# Patient Record
Sex: FEMALE | Race: ASIAN | NOT HISPANIC OR LATINO | URBAN - METROPOLITAN AREA
[De-identification: names, ages, dates, MRNs, and addresses within clinical notes are randomized per-mention and may not be internally consistent; named-entity substitution may affect disease eponyms.]

---

## 2021-02-16 ENCOUNTER — EMERGENCY (EMERGENCY)
Facility: HOSPITAL | Age: 21
LOS: 1 days | Discharge: HOME | End: 2021-02-16
Attending: EMERGENCY MEDICINE | Admitting: EMERGENCY MEDICINE
Payer: COMMERCIAL

## 2021-02-16 VITALS
RESPIRATION RATE: 16 BRPM | HEART RATE: 84 BPM | DIASTOLIC BLOOD PRESSURE: 98 MMHG | OXYGEN SATURATION: 98 % | TEMPERATURE: 99 F | SYSTOLIC BLOOD PRESSURE: 151 MMHG | WEIGHT: 164.02 LBS | HEIGHT: 67 IN

## 2021-02-16 DIAGNOSIS — R51.9 HEADACHE, UNSPECIFIED: ICD-10-CM

## 2021-02-16 LAB
BASE EXCESS BLDMV CALC-SCNC: 0.7 MMOL/L — SIGNIFICANT CHANGE UP
COHGB MFR BLDMV: 0.5 % — SIGNIFICANT CHANGE UP
HCO3 BLDMV-SCNC: 26 MMOL/L — SIGNIFICANT CHANGE UP
HGB FLD-MCNC: 13.9 G/DL — SIGNIFICANT CHANGE UP (ref 11.5–15.5)
METHGB MFR BLDMV: 0.3 % — SIGNIFICANT CHANGE UP
O2 CT VFR BLD CALC: 27 MMHG — LOW (ref 30–65)
O2 CT VFR BLDMV CALC: 9 ML/DL — SIGNIFICANT CHANGE UP
OXYHGB MFR BLDMV: 47 % — SIGNIFICANT CHANGE UP
PCO2 BLDMV: 43 MMHG — SIGNIFICANT CHANGE UP (ref 30–65)
PH BLDMV: 7.4 — SIGNIFICANT CHANGE UP (ref 7.2–7.45)
SAO2 % BLDMV: 47 % — SIGNIFICANT CHANGE UP

## 2021-02-16 PROCEDURE — 99284 EMERGENCY DEPT VISIT MOD MDM: CPT

## 2021-02-16 PROCEDURE — 36415 COLL VENOUS BLD VENIPUNCTURE: CPT

## 2021-02-16 PROCEDURE — 85018 HEMOGLOBIN: CPT

## 2021-02-16 PROCEDURE — 99283 EMERGENCY DEPT VISIT LOW MDM: CPT
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
## 2021-02-16 NOTE — ED PROVIDER NOTE - CONSTITUTIONAL, MLM
very anxious appearing, awake, alert, oriented to person, place, time/situation, standing next to bed, tearful, poor eye contact normal...
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
## 2021-02-16 NOTE — ED ADULT NURSE NOTE - NSIMPLEMENTINTERV_GEN_ALL_ED
Implemented All Universal Safety Interventions:  [place] to call system. Call bell, personal items and telephone within reach. Instruct patient to call for assistance. Room bathroom lighting operational. Non-slip footwear when patient is off stretcher. Physically safe environment: no spills, clutter or unnecessary equipment. Stretcher in lowest position, wheels locked, appropriate side rails in place.
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
## 2021-02-16 NOTE — ED PROVIDER NOTE - PATIENT PORTAL LINK FT
You can access the FollowMyHealth Patient Portal offered by [facility] by registering at the following website: [email or web address]. By joining [facility]’s FollowMyHealth portal, you will also be able to view your health information using other applications (apps) compatible with our system.
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
## 2021-02-16 NOTE — ED PROVIDER NOTE - NSFOLLOWUPINSTRUCTIONS_ED_ALL_ED_FT
Please see your primary care provider in 2-3 days.  Call for appointment.  If you have any problems with followup, please call the ED Referral Coordinator at [phone].  Return to the ER if symptoms worsen or other concerns.      Carbon Monoxide Poisoning    WHAT YOU NEED TO KNOW:    Carbon monoxide (CO) poisoning is a life-threatening condition caused by exposure to high levels of CO. Your brain, organs, and tissues can be damaged from a lack of oxygen. You will need to watch for new signs and symptoms for several weeks or months after treatment.    DISCHARGE INSTRUCTIONS:    Call your local emergency number (911 in the ) if:   •You have chest pain or an irregular or fast heartbeat.      •You or someone close to you has a seizure or is unconscious.      •You have trouble breathing or are breathing faster than usual.      •You feel like you are going to faint.      •You feel weak, have trouble moving, or have severe muscle pain.      •Your urine becomes dark or red.      Call your doctor if:   •You feel dizzy.      •You have a headache or start to vomit.      •Your eyesight becomes blurred.      •You have questions or concerns about your condition or care.      What to do if you think you or someone else was exposed to CO: CO poisoning can seem like the flu. Anyone who may have been exposed to CO needs to be checked by a healthcare provider. The following are steps to take if you believe you or someone else is near a source of CO:   •Move into fresh air. If safely possible, shut off the source of the CO. Wait for a professional to help you if you cannot do this safely.      •Call 911. Explain when the exposure happened and how long you think it lasted.       •Start CPR if needed and you are trained on how to do this. CPR may be needed if the person is not breathing.      Prevent CO poisoning:   •Install a CO detector in every sleeping area in your home. Place it 5 feet above the floor and away from fireplaces or gas-burning equipment. Change the batteries twice each year.      •Check your chimney, furnace, or wood stoves. Check for problems every year before you use them. Have your fireplace flue cleaned on a regular basis.       •Be careful with gas appliances. Do not use barbecues or heaters that burn fuel inside your home or other closed spaces. Do not use your gas kitchen oven to heat your home. Make sure appliances are properly hooded or vented.      •Do not let motor vehicles run in closed areas. This includes letting your car run in a garage. If the car is outside, check that the exhaust pipe is not blocked.      •Do not smoke. Cigarette smoke contains small amounts of CO. This increases your risk of CO poisoning if you are exposed to a source of CO. Ask your healthcare provider for information if you need help quitting.      Follow up with your healthcare provider as directed: You may need to return to have more tests. Write down your questions so you remember to ask them during your visits.        Anxiety    WHAT YOU NEED TO KNOW:    Anxiety is a condition that causes you to feel extremely worried or nervous. The feelings are so strong that they can cause problems with your daily activities or sleep. Anxiety may be triggered by something you fear, or it may happen without a cause. Family or work stress, smoking, caffeine, and alcohol can increase your risk for anxiety. Certain medicines or health conditions can also increase your risk. Anxiety can become a long-term condition if it is not managed or treated.     DISCHARGE INSTRUCTIONS:    Call 911 if:     You have chest pain, tightness, or heaviness that may spread to your shoulders, arms, jaw, neck, or back.      You feel like hurting yourself or someone else.     Contact your healthcare provider if:     Your symptoms get worse or do not get better with treatment.      Your anxiety keeps you from doing your regular daily activities.      You have new symptoms since your last visit.      You have questions or concerns about your condition or care.    Medicines:     Medicines may be given to help you feel more calm and relaxed, and decrease your symptoms.      Take your medicine as directed. Contact your healthcare provider if you think your medicine is not helping or if you have side effects. Tell him of her if you are allergic to any medicine. Keep a list of the medicines, vitamins, and herbs you take. Include the amounts, and when and why you take them. Bring the list or the pill bottles to follow-up visits. Carry your medicine list with you in case of an emergency.    Follow up with your healthcare provider within 2 weeks or as directed: Write down your questions so you remember to ask them during your visits.    Manage anxiety:     Talk to someone about your anxiety. Your healthcare provider may suggest counseling. Cognitive behavioral therapy can help you understand and change how you react to events that trigger your symptoms. You might feel more comfortable talking with a friend or family member about your anxiety. Choose someone you know will be supportive and encouraging.      Find ways to relax. Activities such as exercise, meditation, or listening to music can help you relax. Spend time with friends, or do things you enjoy.      Practice deep breathing. Deep breathing can help you relax when you feel anxious. Focus on taking slow, deep breaths several times a day, or during an anxiety attack. Breathe in through your nose and out through your mouth.       Create a regular sleep routine. Regular sleep can help you feel calmer during the day. Go to sleep and wake up at the same times every day. Do not watch television or use the computer right before bed. Your room should be comfortable, dark, and quiet.       Eat a variety of healthy foods. Healthy foods include fruits, vegetables, low-fat dairy products, lean meats, fish, whole-grain breads, and cooked beans. Healthy foods can help you feel less anxious and have more energy.      Exercise regularly. Exercise can increase your energy level. Exercise may also lift your mood and help you sleep better. Your healthcare provider can help you create an exercise plan.      Do not smoke. Nicotine and other chemicals in cigarettes and cigars can increase anxiety. Ask your healthcare provider for information if you currently smoke and need help to quit. E-cigarettes or smokeless tobacco still contain nicotine. Talk to your healthcare provider before you use these products.       Do not have caffeine. Caffeine can make your symptoms worse. Do not have foods or drinks that are meant to increase your energy level.      Limit or do not drink alcohol. Ask your healthcare provider if alcohol is safe for you. You may not be able to drink alcohol if you take certain anxiety or depression medicines. Limit alcohol to 1 drink per day if you are a woman. Limit alcohol to 2 drinks per day if you are a man. A drink of alcohol is 12 ounces of beer, 5 ounces of wine, or 1½ ounces of liquor.       Do not use drugs. Drugs can make your anxiety worse. It can also make anxiety hard to manage. Talk to your healthcare provider if you use drugs and want help to quit.

## 2023-04-07 PROBLEM — E03.9 HYPOTHYROIDISM, UNSPECIFIED: Chronic | Status: ACTIVE | Noted: 2021-02-16

## 2023-04-11 ENCOUNTER — APPOINTMENT (OUTPATIENT)
Dept: PULMONOLOGY | Facility: CLINIC | Age: 23
End: 2023-04-11
Payer: MEDICAID

## 2023-04-11 VITALS
WEIGHT: 165 LBS | HEIGHT: 67 IN | HEART RATE: 76 BPM | DIASTOLIC BLOOD PRESSURE: 68 MMHG | BODY MASS INDEX: 25.9 KG/M2 | SYSTOLIC BLOOD PRESSURE: 103 MMHG | OXYGEN SATURATION: 100 % | TEMPERATURE: 96.6 F

## 2023-04-11 DIAGNOSIS — Z83.49 FAMILY HISTORY OF OTHER ENDOCRINE, NUTRITIONAL AND METABOLIC DISEASES: ICD-10-CM

## 2023-04-11 DIAGNOSIS — F17.200 NICOTINE DEPENDENCE, UNSPECIFIED, UNCOMPLICATED: ICD-10-CM

## 2023-04-11 DIAGNOSIS — Z78.9 OTHER SPECIFIED HEALTH STATUS: ICD-10-CM

## 2023-04-11 DIAGNOSIS — J30.9 ALLERGIC RHINITIS, UNSPECIFIED: ICD-10-CM

## 2023-04-11 PROBLEM — Z00.00 ENCOUNTER FOR PREVENTIVE HEALTH EXAMINATION: Status: ACTIVE | Noted: 2023-04-11

## 2023-04-11 PROCEDURE — 94618 PULMONARY STRESS TESTING: CPT

## 2023-04-11 PROCEDURE — 99204 OFFICE O/P NEW MOD 45 MIN: CPT | Mod: 25

## 2023-04-11 PROCEDURE — 94060 EVALUATION OF WHEEZING: CPT

## 2023-04-11 PROCEDURE — ZZZZZ: CPT

## 2023-04-11 PROCEDURE — 94726 PLETHYSMOGRAPHY LUNG VOLUMES: CPT

## 2023-04-11 PROCEDURE — 94729 DIFFUSING CAPACITY: CPT

## 2023-04-11 PROCEDURE — 99407 BEHAV CHNG SMOKING > 10 MIN: CPT

## 2023-04-11 RX ORDER — NORGESTIMATE AND ETHINYL ESTRADIOL 0.25-0.035
0.25-35 KIT ORAL
Refills: 0 | Status: ACTIVE | COMMUNITY

## 2023-04-11 RX ORDER — NICOTINE 21 MG/24HR
14 PATCH, TRANSDERMAL 24 HOURS TRANSDERMAL DAILY
Qty: 1 | Refills: 1 | Status: ACTIVE | COMMUNITY
Start: 2023-04-11 | End: 1900-01-01

## 2023-04-11 RX ORDER — FLUTICASONE PROPIONATE 50 UG/1
50 SPRAY, METERED NASAL TWICE DAILY
Qty: 1 | Refills: 2 | Status: ACTIVE | COMMUNITY
Start: 2023-04-11 | End: 1900-01-01

## 2023-04-11 RX ORDER — LEVOTHYROXINE SODIUM 137 UG/1
TABLET ORAL
Refills: 0 | Status: ACTIVE | COMMUNITY

## 2023-04-11 NOTE — REASON FOR VISIT
[Consultation] : a consultation [Abnormal CXR/ Chest CT] : an abnormal CXR/ chest CT [Cough] : cough [Nicotine Dependence] : nicotine dependence [TextBox_13] : Nikki Chin MD (phone [phone])

## 2023-04-11 NOTE — HISTORY OF PRESENT ILLNESS
[Current] : current [TextBox_4] : 23 year old female with h/o eating disorder, current smoker (tobacco and vaping) came to clinic for diagnosis of Emphysema on CT chest. Patient is vaping and smoking for last 5 years. She was enrolled in study at [facility] where CT chest was done for study for patients doing vaping. She denies SOB with exertion or at rest. She is c/o cough, nasal congestion and throat clearing. Denies fever, sore throat or pedal edema.

## 2023-04-11 NOTE — PHYSICAL EXAM
[No Acute Distress] : no acute distress [Well Nourished] : well nourished [Normal Oropharynx] : normal oropharynx [II] : Mallampati Class: II [Normal Appearance] : normal appearance [No JVD] : no jvd [Normal Rate/Rhythm] : normal rate/rhythm [Normal S1, S2] : normal s1, s2 [No Resp Distress] : no resp distress [Clear to Auscultation Bilaterally] : clear to auscultation bilaterally [No Clubbing] : no clubbing [No Cyanosis] : no cyanosis [No Rash] : no rash

## 2023-04-11 NOTE — COUNSELING
[Cessation strategies including cessation program discussed] : Cessation strategies including cessation program discussed [Use of nicotine replacement therapies and other medications discussed] : Use of nicotine replacement therapies and other medications discussed [Encouraged to pick a quit date and identify support needed to quit] : Encouraged to pick a quit date and identify support needed to quit [Smoking Cessation Program Referral] : Smoking Cessation Program Referral  [Yes] : Willing to quit smoking [Participate in Class] : Participate in class [FreeTextEntry3] : 12 minutes

## 2023-04-11 NOTE — REVIEW OF SYSTEMS
[Fever] : no fever [Chills] : no chills [Dry Eyes] : no dry eyes [Nasal Congestion] : nasal congestion [Postnasal Drip] : postnasal drip [Cough] : cough [SOB on Exertion] : no sob on exertion [Chest Discomfort] : no chest discomfort [Orthopnea] : no orthopnea [Hay Fever] : no hay fever [Nasal Discharge] : nasal discharge [GERD] : no gerd [Diarrhea] : no diarrhea

## 2023-04-11 NOTE — DISCUSSION/SUMMARY
[FreeTextEntry1] : 23 year old female with h/o eating disorder, current smoker (tobacco and vaping) came to clinic for diagnosis of Emphysema on CT chest. \par \par Review:\par - CT chest (12/22): I looked at images on phone and report. Upper lobe minimal emphysema. \par - PFT (4 /23 ): FEV1 96, FEV1/FVC 87, TLC 83, DLCO 57, Rev 2%\par - Six min walk test (4/23): 100% on RA with exertion.\par \par A/P\par Emphysema on CT chest:\par - Upper lobe minimal emphysema\par - PFT showed normal spirometry with mild reduction in DLCO\par - No indication for inhaler\par - Plan for alpha 1 anti trypsin level during next visit.\par \par Cough/allergic rhinitis\par - Flonase 1 puff bid\par - Follow up after 4 weeks\par \par Tobacco Dependence:\par - Counselling done\par - Nicotine patch ordered (14 mg)\par - Follow up after 4 weeks. Will plan to increase to 21 mg if tolerated well.\par - Referral done to quit smoking center. \par

## 2023-04-11 NOTE — CONSULT LETTER
[Dear  ___] : Dear  [unfilled], [Consult Letter:] : I had the pleasure of evaluating your patient, [unfilled]. [Please see my note below.] : Please see my note below. [Consult Closing:] : Thank you very much for allowing me to participate in the care of this patient.  If you have any questions, please do not hesitate to contact me. [FreeTextEntry3] : Sincerely\par \par Can Langston MD [facility]\par , [employer]\par Associate , Pulmonary and Critical Care Fellowship\par Pulmonary and Critical Care\par [facility]\par Phone: [phone]\par

## 2023-05-18 ENCOUNTER — APPOINTMENT (OUTPATIENT)
Dept: PULMONOLOGY | Facility: CLINIC | Age: 23
End: 2023-05-18